# Patient Record
Sex: MALE | URBAN - METROPOLITAN AREA
[De-identification: names, ages, dates, MRNs, and addresses within clinical notes are randomized per-mention and may not be internally consistent; named-entity substitution may affect disease eponyms.]

---

## 2021-12-21 ENCOUNTER — APPOINTMENT (RX ONLY)
Dept: URBAN - METROPOLITAN AREA CLINIC 120 | Facility: CLINIC | Age: 66
Setting detail: DERMATOLOGY
End: 2021-12-21

## 2021-12-21 DIAGNOSIS — L60.8 OTHER NAIL DISORDERS: ICD-10-CM

## 2021-12-21 DIAGNOSIS — L20.89 OTHER ATOPIC DERMATITIS: ICD-10-CM

## 2021-12-21 DIAGNOSIS — L81.4 OTHER MELANIN HYPERPIGMENTATION: ICD-10-CM

## 2021-12-21 PROBLEM — L60.9 NAIL DISORDER, UNSPECIFIED: Status: ACTIVE | Noted: 2021-12-21

## 2021-12-21 PROBLEM — L20.84 INTRINSIC (ALLERGIC) ECZEMA: Status: ACTIVE | Noted: 2021-12-21

## 2021-12-21 PROCEDURE — ? DIAGNOSIS COMMENT

## 2021-12-21 PROCEDURE — ? PHOTO-DOCUMENTATION

## 2021-12-21 PROCEDURE — 99203 OFFICE O/P NEW LOW 30 MIN: CPT

## 2021-12-21 PROCEDURE — ? OBSERVATION AND MEASURE

## 2021-12-21 PROCEDURE — ? COUNSELING

## 2021-12-21 ASSESSMENT — LOCATION ZONE DERM: LOCATION ZONE: FINGERNAIL

## 2021-12-21 ASSESSMENT — LOCATION SIMPLE DESCRIPTION DERM: LOCATION SIMPLE: RIGHT MIDDLE FINGER

## 2021-12-21 ASSESSMENT — LOCATION DETAILED DESCRIPTION DERM: LOCATION DETAILED: RIGHT MIDDLE FINGERNAIL

## 2021-12-21 NOTE — HPI: OTHER
Condition:: Itching to both lower legs possible insect bites a couple of months ago. Itchiness comes and goes. Hydroxyzine, cetirizine and hydrocortisone were given by pcp.
Please Describe Your Condition:: Itching

## 2021-12-21 NOTE — PROCEDURE: DIAGNOSIS COMMENT
Comment: Counseled the pt on the potential risk of not taking a biopsy today. Discussed the severity of nail melanoma, and informed the him that this could be fatal if it is a melanoma. The patient was given an option to take a biopsy today or just watch, and he opted to watch it for now. Pt fully understands the risks that come with this and still chose to do so.
Detail Level: Simple
Render Risk Assessment In Note?: no